# Patient Record
Sex: MALE | Race: WHITE | Employment: FULL TIME | URBAN - METROPOLITAN AREA
[De-identification: names, ages, dates, MRNs, and addresses within clinical notes are randomized per-mention and may not be internally consistent; named-entity substitution may affect disease eponyms.]

---

## 2024-05-01 ENCOUNTER — OFFICE VISIT (OUTPATIENT)
Dept: FAMILY MEDICINE CLINIC | Facility: CLINIC | Age: 44
End: 2024-05-01
Payer: COMMERCIAL

## 2024-05-01 VITALS
SYSTOLIC BLOOD PRESSURE: 128 MMHG | HEIGHT: 72 IN | DIASTOLIC BLOOD PRESSURE: 90 MMHG | RESPIRATION RATE: 20 BRPM | WEIGHT: 234 LBS | HEART RATE: 79 BPM | BODY MASS INDEX: 31.69 KG/M2 | OXYGEN SATURATION: 99 % | TEMPERATURE: 98 F

## 2024-05-01 DIAGNOSIS — J01.00 ACUTE MAXILLARY SINUSITIS, RECURRENCE NOT SPECIFIED: Primary | ICD-10-CM

## 2024-05-01 PROCEDURE — 3074F SYST BP LT 130 MM HG: CPT | Performed by: PHYSICIAN ASSISTANT

## 2024-05-01 PROCEDURE — 3080F DIAST BP >= 90 MM HG: CPT | Performed by: PHYSICIAN ASSISTANT

## 2024-05-01 PROCEDURE — 3008F BODY MASS INDEX DOCD: CPT | Performed by: PHYSICIAN ASSISTANT

## 2024-05-01 PROCEDURE — 99213 OFFICE O/P EST LOW 20 MIN: CPT | Performed by: PHYSICIAN ASSISTANT

## 2024-05-01 RX ORDER — SERTRALINE HYDROCHLORIDE 100 MG/1
100 TABLET, FILM COATED ORAL DAILY
COMMUNITY

## 2024-05-01 RX ORDER — DOXYCYCLINE HYCLATE 100 MG/1
100 CAPSULE ORAL 2 TIMES DAILY
Qty: 20 CAPSULE | Refills: 0 | Status: SHIPPED | OUTPATIENT
Start: 2024-05-01 | End: 2024-05-11

## 2024-05-01 NOTE — PATIENT INSTRUCTIONS
Flonase  Sudafed  Antihistamine  If no improvement start Doxycycline  Probiotic  Follow up with PCP   If worse seek treatment

## 2024-05-01 NOTE — PROGRESS NOTES
CHIEF COMPLAINT:     Chief Complaint   Patient presents with    Cough     S/s for 1 month, worst for 2 weeks.  Wet cough with yellow mucus, right ear clogged and ringing.  OTC sudafed/Flonase taken.         HPI:   Francesco Ross is a 44 year old male who presents for cold symptoms for one month.  Symptoms have progressed into sinus congestion and have been worsening since onset.  The patient reports purulent nasal discharge, nasal congestion, sinus pressure, and post nasal drip. Patient also reports sneezing, cough, low grade temperature of 99, and sweats.   Denies fever, dental pain, tinnitus, N/V/D, SOB and wheezing.  The patient denies history of allergies.  The patient denies itchy/ watery eyes.  Has treated symptoms with OTC Flonase, Sudafed, and Amoxicillin for 10 days.       Current Outpatient Medications   Medication Sig Dispense Refill    sertraline 100 MG Oral Tab Take 1 tablet (100 mg total) by mouth daily.      doxycycline 100 MG Oral Cap Take 1 capsule (100 mg total) by mouth 2 (two) times daily for 10 days. 20 capsule 0    gabapentin 100 MG Oral Cap 1 tablet three times day 7 days then 2 tablets three times day for 3 weeks. 180 capsule 0    guaiFENesin-codeine (CHERATUSSIN AC) 100-10 MG/5ML Oral Solution Take 5 mL by mouth every 6 (six) hours as needed. 236 mL 0      History reviewed. No pertinent past medical history.   Past Surgical History:   Procedure Laterality Date    Larynx surg proc unlisted        History reviewed. No pertinent family history.   Social History     Socioeconomic History    Marital status:    Tobacco Use    Smoking status: Former     Current packs/day: 0.00     Types: Cigarettes     Quit date: 3/17/2012     Years since quittin.1    Smokeless tobacco: Never   Substance and Sexual Activity    Alcohol use: Yes    Drug use: No     Social Determinants of Health      Received from [facility]         REVIEW OF SYSTEMS:   GENERAL:  Normal appetite  SKIN:  no rashes or abnormal skin lesions  HEENT: See HPI.    LUNGS: denies shortness of breath or wheezing, See HPI  CARDIOVASCULAR: denies chest pain or palpitations   GI: denies N/V/C or abdominal pain  NEURO: + sinus headaches.  No numbness or tingling in face.    EXAM:   /90   Pulse 79   Temp 98.2 °F (36.8 °C) (Oral)   Resp 20   Ht 6' (1.829 m)   Wt 234 lb (106.1 kg)   SpO2 99%   BMI 31.74 kg/m²   GENERAL: well developed, well nourished,in no apparent distress  SKIN: no rashes,no suspicious lesions  HEAD: atraumatic, normocephalic, +tenderness on palpation of the right maxillary sinuses  EYES: PERRL. EOMI.  Conjunctiva normal.  Cornea clear.  Lid margins normal.   EARS: Left TM normal, + bulging, no retraction, no fluid, bony landmarks normal.   Right TM normal, no bulging, no retraction, + fluid, bony landmarks normal.  NOSE: nostrils patent, + nasal drainage, nasal mucosa reddened and inflamed.   THROAT: oral mucosa pink, moist. No visible dental caries. Posterior pharynx is without erythema and without exudates.  Uvula is midline.    NECK: supple, non-tender  LUNGS: clear to auscultation bilaterally, no wheezes or rhonchi. Breathing is non labored.  CARDIO: RRR without murmur  EXTREMITIES: no cyanosis, clubbing or edema  LYMPH:  No lymphadenopathy.        ASSESSMENT AND PLAN:   Francesco Ross is a 44 year old male who presents with Cough (S/s for 1 month, worst for 2 weeks.  Wet cough with yellow mucus, right ear clogged and ringing.  OTC sudafed/Flonase taken.  ). Symptoms are consistent with:      ASSESSMENT:  Encounter Diagnosis   Name Primary?    Acute maxillary sinusitis, recurrence not specified Yes         PLAN: Meds as below.  Comfort care instructions as listed in Patient Instructions    Meds & Refills for this Visit:  Requested Prescriptions     Signed Prescriptions Disp Refills    doxycycline 100 MG Oral Cap 20 capsule 0     Sig: Take 1 capsule (100 mg total) by mouth 2 (two) times daily  for 10 days.       Risks, benefits, side effects of medication addressed and explained.    Patient Instructions   Flonase  Sudafed  Antihistamine  If no improvement start Doxycycline  Probiotic  Follow up with PCP   If worse seek treatment      The patient indicates understanding of these issues and agrees to the plan.  The patient is asked to return if sx's persist or worsen.

## 2024-11-02 ENCOUNTER — APPOINTMENT (OUTPATIENT)
Dept: CT IMAGING | Age: 44
End: 2024-11-02
Attending: EMERGENCY MEDICINE
Payer: COMMERCIAL

## 2024-11-02 ENCOUNTER — HOSPITAL ENCOUNTER (INPATIENT)
Facility: HOSPITAL | Age: 44
LOS: 1 days | Discharge: HOME | End: 2024-11-04
Attending: EMERGENCY MEDICINE | Admitting: HOSPITALIST
Payer: COMMERCIAL

## 2024-11-02 DIAGNOSIS — Z85.72 HISTORY OF NON-HODGKIN LYMPHOMA: ICD-10-CM

## 2024-11-02 DIAGNOSIS — K56.609 SBO (SMALL BOWEL OBSTRUCTION) (HCC): Primary | ICD-10-CM

## 2024-11-02 LAB
ALBUMIN SERPL-MCNC: 4.1 G/DL (ref 3.4–5)
ALBUMIN/GLOB SERPL: 1.2 {RATIO} (ref 1–2)
ALP LIVER SERPL-CCNC: 84 U/L
ALT SERPL-CCNC: 32 U/L
ANION GAP SERPL CALC-SCNC: 5 MMOL/L (ref 0–18)
AST SERPL-CCNC: 21 U/L (ref 15–37)
BASOPHILS # BLD AUTO: 0.02 X10(3) UL (ref 0–0.2)
BASOPHILS NFR BLD AUTO: 0.2 %
BILIRUB SERPL-MCNC: 0.4 MG/DL (ref 0.1–2)
BUN BLD-MCNC: 18 MG/DL (ref 9–23)
CALCIUM BLD-MCNC: 9.9 MG/DL (ref 8.5–10.1)
CHLORIDE SERPL-SCNC: 105 MMOL/L (ref 98–112)
CO2 SERPL-SCNC: 27 MMOL/L (ref 21–32)
CREAT BLD-MCNC: 0.92 MG/DL
EGFRCR SERPLBLD CKD-EPI 2021: 105 ML/MIN/1.73M2 (ref 60–?)
EOSINOPHIL # BLD AUTO: 0.16 X10(3) UL (ref 0–0.7)
EOSINOPHIL NFR BLD AUTO: 1.8 %
ERYTHROCYTE [DISTWIDTH] IN BLOOD BY AUTOMATED COUNT: 18.6 %
GLOBULIN PLAS-MCNC: 3.3 G/DL (ref 2.8–4.4)
GLUCOSE BLD-MCNC: 101 MG/DL (ref 70–99)
HCT VFR BLD AUTO: 47.4 %
HGB BLD-MCNC: 14.9 G/DL
IMM GRANULOCYTES # BLD AUTO: 0.02 X10(3) UL (ref 0–1)
IMM GRANULOCYTES NFR BLD: 0.2 %
LIPASE SERPL-CCNC: 33 U/L (ref 12–53)
LYMPHOCYTES # BLD AUTO: 1.95 X10(3) UL (ref 1–4)
LYMPHOCYTES NFR BLD AUTO: 22.3 %
MCH RBC QN AUTO: 21.3 PG (ref 26–34)
MCHC RBC AUTO-ENTMCNC: 31.4 G/DL (ref 31–37)
MCV RBC AUTO: 67.7 FL
MONOCYTES # BLD AUTO: 0.8 X10(3) UL (ref 0.1–1)
MONOCYTES NFR BLD AUTO: 9.2 %
NEUTROPHILS # BLD AUTO: 5.79 X10 (3) UL (ref 1.5–7.7)
NEUTROPHILS # BLD AUTO: 5.79 X10(3) UL (ref 1.5–7.7)
NEUTROPHILS NFR BLD AUTO: 66.3 %
OSMOLALITY SERPL CALC.SUM OF ELEC: 286 MOSM/KG (ref 275–295)
PLATELET # BLD AUTO: 272 10(3)UL (ref 150–450)
POTASSIUM SERPL-SCNC: 3.6 MMOL/L (ref 3.5–5.1)
PROT SERPL-MCNC: 7.4 G/DL (ref 6.4–8.2)
RBC # BLD AUTO: 7 X10(6)UL
SODIUM SERPL-SCNC: 137 MMOL/L (ref 136–145)
WBC # BLD AUTO: 8.7 X10(3) UL (ref 4–11)

## 2024-11-02 PROCEDURE — 74174 CTA ABD&PLVS W/CONTRAST: CPT | Performed by: EMERGENCY MEDICINE

## 2024-11-02 RX ORDER — HYDROMORPHONE HYDROCHLORIDE 1 MG/ML
0.5 INJECTION, SOLUTION INTRAMUSCULAR; INTRAVENOUS; SUBCUTANEOUS EVERY 30 MIN PRN
Status: DISCONTINUED | OUTPATIENT
Start: 2024-11-02 | End: 2024-11-04

## 2024-11-02 RX ORDER — ONDANSETRON 2 MG/ML
4 INJECTION INTRAMUSCULAR; INTRAVENOUS ONCE
Status: COMPLETED | OUTPATIENT
Start: 2024-11-02 | End: 2024-11-02

## 2024-11-03 PROBLEM — Z85.72 HISTORY OF NON-HODGKIN LYMPHOMA: Status: ACTIVE | Noted: 2024-11-03

## 2024-11-03 LAB
ADENOVIRUS F 40/41 PCR: NEGATIVE
ASTROVIRUS PCR: NEGATIVE
C CAYETANENSIS DNA SPEC QL NAA+PROBE: NEGATIVE
C DIFF TOX B STL QL: NEGATIVE
CAMPY SP DNA.DIARRHEA STL QL NAA+PROBE: NEGATIVE
CRYPTOSP DNA SPEC QL NAA+PROBE: NEGATIVE
EAEC PAA PLAS AGGR+AATA ST NAA+NON-PRB: NEGATIVE
EC STX1+STX2 + H7 FLIC SPEC NAA+PROBE: NEGATIVE
ENTAMOEBA HISTOLYTICA PCR: NEGATIVE
EPEC EAE GENE STL QL NAA+NON-PROBE: NEGATIVE
ETEC LTA+ST1A+ST1B TOX ST NAA+NON-PROBE: NEGATIVE
GIARDIA LAMBLIA PCR: NEGATIVE
NOROVIRUS GI/GII PCR: NEGATIVE
P SHIGELLOIDES DNA STL QL NAA+PROBE: NEGATIVE
ROTAVIRUS A PCR: NEGATIVE
SALMONELLA DNA SPEC QL NAA+PROBE: NEGATIVE
SAPOVIRUS PCR: NEGATIVE
SHIGELLA SP+EIEC IPAH ST NAA+NON-PROBE: NEGATIVE
V CHOLERAE DNA SPEC QL NAA+PROBE: NEGATIVE
VIBRIO DNA SPEC NAA+PROBE: NEGATIVE
YERSINIA DNA SPEC NAA+PROBE: NEGATIVE

## 2024-11-03 PROCEDURE — 99255 IP/OBS CONSLTJ NEW/EST HI 80: CPT | Performed by: STUDENT IN AN ORGANIZED HEALTH CARE EDUCATION/TRAINING PROGRAM

## 2024-11-03 PROCEDURE — 99223 1ST HOSP IP/OBS HIGH 75: CPT | Performed by: STUDENT IN AN ORGANIZED HEALTH CARE EDUCATION/TRAINING PROGRAM

## 2024-11-03 RX ORDER — ACETAMINOPHEN 10 MG/ML
1000 INJECTION, SOLUTION INTRAVENOUS EVERY 6 HOURS PRN
Status: DISCONTINUED | OUTPATIENT
Start: 2024-11-03 | End: 2024-11-04

## 2024-11-03 RX ORDER — PROCHLORPERAZINE EDISYLATE 5 MG/ML
5 INJECTION INTRAMUSCULAR; INTRAVENOUS EVERY 8 HOURS PRN
Status: DISCONTINUED | OUTPATIENT
Start: 2024-11-03 | End: 2024-11-04

## 2024-11-03 RX ORDER — ONDANSETRON 2 MG/ML
4 INJECTION INTRAMUSCULAR; INTRAVENOUS EVERY 4 HOURS PRN
Status: ACTIVE | OUTPATIENT
Start: 2024-11-03 | End: 2024-11-03

## 2024-11-03 RX ORDER — HYDROMORPHONE HYDROCHLORIDE 1 MG/ML
0.2 INJECTION, SOLUTION INTRAMUSCULAR; INTRAVENOUS; SUBCUTANEOUS EVERY 2 HOUR PRN
Status: DISCONTINUED | OUTPATIENT
Start: 2024-11-03 | End: 2024-11-04

## 2024-11-03 RX ORDER — SODIUM CHLORIDE 9 MG/ML
INJECTION, SOLUTION INTRAVENOUS CONTINUOUS
Status: DISCONTINUED | OUTPATIENT
Start: 2024-11-03 | End: 2024-11-04

## 2024-11-03 RX ORDER — ONDANSETRON 2 MG/ML
4 INJECTION INTRAMUSCULAR; INTRAVENOUS EVERY 6 HOURS PRN
Status: DISCONTINUED | OUTPATIENT
Start: 2024-11-03 | End: 2024-11-04

## 2024-11-03 RX ORDER — SODIUM CHLORIDE 9 MG/ML
INJECTION, SOLUTION INTRAVENOUS CONTINUOUS
Status: ACTIVE | OUTPATIENT
Start: 2024-11-03 | End: 2024-11-03

## 2024-11-03 RX ORDER — ENOXAPARIN SODIUM 100 MG/ML
40 INJECTION SUBCUTANEOUS DAILY
Status: DISCONTINUED | OUTPATIENT
Start: 2024-11-03 | End: 2024-11-04

## 2024-11-03 RX ORDER — HYDROMORPHONE HYDROCHLORIDE 1 MG/ML
0.4 INJECTION, SOLUTION INTRAMUSCULAR; INTRAVENOUS; SUBCUTANEOUS EVERY 2 HOUR PRN
Status: DISCONTINUED | OUTPATIENT
Start: 2024-11-03 | End: 2024-11-04

## 2024-11-03 RX ORDER — HYDROMORPHONE HYDROCHLORIDE 1 MG/ML
0.5 INJECTION, SOLUTION INTRAMUSCULAR; INTRAVENOUS; SUBCUTANEOUS EVERY 30 MIN PRN
Status: ACTIVE | OUTPATIENT
Start: 2024-11-03 | End: 2024-11-03

## 2024-11-03 RX ORDER — ACETAMINOPHEN 500 MG
1000 TABLET ORAL EVERY 6 HOURS PRN
Status: DISCONTINUED | OUTPATIENT
Start: 2024-11-03 | End: 2024-11-04

## 2024-11-03 RX ORDER — HYDROMORPHONE HYDROCHLORIDE 1 MG/ML
0.8 INJECTION, SOLUTION INTRAMUSCULAR; INTRAVENOUS; SUBCUTANEOUS EVERY 2 HOUR PRN
Status: DISCONTINUED | OUTPATIENT
Start: 2024-11-03 | End: 2024-11-04

## 2024-11-03 RX ORDER — ECHINACEA PURPUREA EXTRACT 125 MG
1 TABLET ORAL
Status: DISCONTINUED | OUTPATIENT
Start: 2024-11-03 | End: 2024-11-04

## 2024-11-03 NOTE — CONSULTS
[facility]                       Gastroenterology Consultation-[facility]    Francesco Ross Patient Status:  Inpatient    2/10/1980 MRN [id number]   Location [facility] 3NW-A Attending Billy Abarca*   Hosp Day # 0 PCP BARON MARCUS MD         Reason for consultation: SBO    HPI:   44-year-old male with a history of Burkitt's lymphoma on chemotherapy presenting with SBO.  Patient began having abdominal pain yesterday with last bowel movement yesterday morning and no flatus at this time.  Patient has a prior SBO related to lymphadenopathy from Burkitt's lymphoma in the past.  Patient is treated at [facility] primarily for this.  Patient with last colonoscopy in  at time of diagnosis of lymphoma.  No issues with chronic diarrhea, chronic abdominal pain, GI bleeding.  CT abdomen pelvis with transition point of SBO in the terminal ileum.    PMHx:   Past Medical History:    Lymphoma (HCC)       PSHx:   Past Surgical History:   Procedure Laterality Date    Hip replacement surgery      Larynx surg proc unlisted         Medications:    [] sodium chloride 0.9% infusion   Intravenous Continuous    [] HYDROmorphone (Dilaudid) 1 MG/ML injection 0.5 mg  0.5 mg Intravenous Q30 Min PRN    [] ondansetron (Zofran) 4 MG/2ML injection 4 mg  4 mg Intravenous Q4H PRN    HYDROmorphone (Dilaudid) 1 MG/ML injection 0.2 mg  0.2 mg Intravenous Q2H PRN    Or    HYDROmorphone (Dilaudid) 1 MG/ML injection 0.4 mg  0.4 mg Intravenous Q2H PRN    Or    HYDROmorphone (Dilaudid) 1 MG/ML injection 0.8 mg  0.8 mg Intravenous Q2H PRN    sodium chloride 0.9% infusion   Intravenous Continuous    enoxaparin (Lovenox) 40 MG/0.4ML SUBQ injection 40 mg  40 mg Subcutaneous Daily    acetaminophen (Ofirmev) 10 mg/mL infusion premix 1,000 mg  1,000 mg Intravenous Q6H PRN    ondansetron (Zofran) 4 MG/2ML injection 4 mg  4 mg Intravenous Q6H PRN    prochlorperazine (Compazine) 10 MG/2ML  injection 5 mg  5 mg Intravenous Q8H PRN    glycerin-hypromellose- (Artificial Tears) 0.2-0.2-1 % ophthalmic solution 1 drop  1 drop Both Eyes QID PRN    sodium chloride (Saline Mist) 0.65 % nasal solution 1 spray  1 spray Each Nare Q3H PRN    HYDROmorphone (Dilaudid) 1 MG/ML injection 0.5 mg  0.5 mg Intravenous Q30 Min PRN    [COMPLETED] ondansetron (Zofran) 4 MG/2ML injection 4 mg  4 mg Intravenous Once    [COMPLETED] sodium chloride 0.9 % IV bolus 1,000 mL  1,000 mL Intravenous Once    [COMPLETED] iopamidol 76% (ISOVUE-370) injection for power injector  80 mL Intravenous ONCE PRN       Allergies: Allergies[1]    SocHx:    Social History     Socioeconomic History    Marital status:    Tobacco Use    Smoking status: Former     Current packs/day: 0.00     Types: Cigarettes     Quit date: 3/17/2012     Years since quittin.6    Smokeless tobacco: Never   Vaping Use    Vaping status: Never Used   Substance and Sexual Activity    Alcohol use: Yes    Drug use: Yes     Types: Cannabis     Social Drivers of Health     Food Insecurity: No Food Insecurity (11/3/2024)    Food Insecurity     Food Insecurity: Never true   Transportation Needs: No Transportation Needs (11/3/2024)    Transportation Needs     Lack of Transportation: No    Received from [facility]    Social Connections   Housing Stability: Low Risk  (11/3/2024)    Housing Stability     Housing Instability: No         FamHx:  No family history on file.      ROS:  In addition to the pertinent positives described above:            Infectious Disease:  No chronic infections or recent fevers prior to the acute illness            Cardiovascular: No history of CAD, prior MI, chest pain, or palpitations            Respiratory: No shortness of breath, asthma, copd, recurrent pneumonia            Hematologic: The patient reports no easy bruising, frequent gum bleeding or nose bleeding;  The patient has no history of known chronic  anemia            Dermatologic: The patient reports no recent rashes or chronic skin disorders            Rheumatologic: The patient reports no history of chronic arthritis, myalgias, arthralgias            Genitourinary:  The patient reports no history of recurrent urinary tract infections, hematuria, dysuria, or nephrolithiasis           Psychiatric: The patient reports no history of depression, anxiety, suicidal ideation, or homicidal ideation           Oncologic: The patient reports on history of prior solid tumor or hematologic malignancy           ENT: The patient reports no hoarseness of voice, hearing loss, sinus congestion, tinnitus           Neurologic: The patient reports no history of seizure, stroke, or frequent headaches      PE: /86 (BP Location: Right arm)   Pulse 83   Temp 98 °F (36.7 °C) (Oral)   Resp 18   Ht 6' 3\" (1.905 m)   Wt 243 lb 8 oz (110.5 kg)   SpO2 100%   BMI 30.44 kg/m²     Gen: AAO x 3, NAD   CV: Regular rate and rhythm  Resp: Clear to auscultation bilaterally  Abdomen: Soft, non-tender, non-distended   Ext: No peripheral edema or cyanosis  Skin: Warm and dry  Psychiatric: Appropriate mood and congruent affect without obvious depression or anxiety    Labs:   Lab Results   Component Value Date    WBC 8.7 11/02/2024    HGB 14.9 11/02/2024    HCT 47.4 11/02/2024    .0 11/02/2024    CREATSERUM 0.92 11/02/2024    BUN 18 11/02/2024     11/02/2024    K 3.6 11/02/2024     11/02/2024    CO2 27.0 11/02/2024     11/02/2024    CA 9.9 11/02/2024    ALB 4.1 11/02/2024    ALKPHO 84 11/02/2024    BILT 0.4 11/02/2024    AST 21 11/02/2024    ALT 32 11/02/2024    LIP 33 11/02/2024     Recent Labs   Lab 11/02/24 2048   *   BUN 18   CREATSERUM 0.92   CA 9.9      K 3.6      CO2 27.0     Recent Labs   Lab 11/02/24 2047   RBC 7.00*   HGB 14.9   HCT 47.4   MCV 67.7*   MCH 21.3*   MCHC 31.4   RDW 18.6   NEPRELIM 5.79   WBC 8.7   .0       Recent  Labs   Lab 11/02/24 2048   ALT 32   AST 21         Assessment and Plan:   -Patient with acute SBO in the setting of Burkitt's lymphoma with no recent colonoscopy  -Would be reasonable to consider colonoscopy, MR enterography, as well as possible PET scan in the near future; case discussed with patient as well as general surgery at length; after long discussion, would be reasonable given establish oncologic care at [facility], to reach out for transfer to [facility] for further care; patient is on board with this plan  -N.p.o.; would consider NG tube for decompression but will defer to general surgery  -General Surgery following  -Supportive care with IV fluids, antiemetics        Thank you for the consultation, we will follow the patient with you.    Royce Ryan MD  9:45 AM  11/3/2024  [facility]  [phone]             [1] No Known Allergies

## 2024-11-03 NOTE — CONSULTS
[facility]  Report of Consultation    Francesco Ross Patient Status:  Inpatient    2/10/1980 MRN [id number]   Location [facility] 3NW-A Attending Billy Abarca*   Hosp Day # 0 PCP BARON MARCUS MD     Requesting Physician:  Dr. Doran    Reason for Consultation:  Small bowel obstruction    Chief Complaint:  Abdominal pain    History of Present Illness:  Francesco Ross is a 44 year old male with a past medical history significant for Burkitt lymphoma s/p chemo and in remission (primary management at [facility]) who presents to [place] emergency department l on 2024 with concerns of abdominal pain that began earlier that day. He reports associated nausea and emesis. He reports last bowel movement earlier that AM. He denies passing flatus. He reports no known sick contact.     Upon presentation to the hospital, he was afebrile and hypertensive (167/126). Laboratory workup revealed no leukocytosis, WBC 8.7, hemoglobin 14.9, platelets 272,000. CMP revealed no gross electrolyte abnormalities, no acute kidney injury, creatinine 0.92, no elevation in LFTs. Lipase within normal limits (33). CT of the abdomen and pelvis revealed multiple mid to distal fluid-filled distended small bowel loops present with nonspecific air-fluid levels consistent with small bowel obstruction. Transition point is at the terminal ileum. There is infiltration of the mesentery and fluid adjacent to the terminal ileum. Small amount of free pelvic fluid.     He has a past medical history significant for Burkitt lymphoma. Per chart review, he was admitted in 2020 to [facility] in [place] for concern of SBO. He had colonoscopy completed at that time where biopsy from ulcer in the terminal ileum reported high-grade lymphoma. He reports he follows with an oncologist at [facility]. He reports no previous abdominal surgeries.  He is not on any blood thinning medication.     History:  Past Medical  History:    Lymphoma (HCC)     Past Surgical History:   Procedure Laterality Date    Hip replacement surgery      Larynx surg proc unlisted       No family history on file.   reports that he quit smoking about 12 years ago. His smoking use included cigarettes. He has never used smokeless tobacco. He reports current alcohol use. He reports current drug use. Drug: Cannabis.    Allergies:  Allergies[1]    Medications:  Medications Prior to Admission   Medication Sig    sertraline 100 MG Oral Tab Take 0.5 tablets (50 mg total) by mouth daily.    gabapentin 100 MG Oral Cap 1 tablet three times day 7 days then 2 tablets three times day for 3 weeks. (Patient not taking: Reported on 11/2/2024)    guaiFENesin-codeine (CHERATUSSIN AC) 100-10 MG/5ML Oral Solution Take 5 mL by mouth every 6 (six) hours as needed. (Patient not taking: Reported on 11/2/2024)         Current Facility-Administered Medications:     HYDROmorphone (Dilaudid) 1 MG/ML injection 0.2 mg, 0.2 mg, Intravenous, Q2H PRN **OR** HYDROmorphone (Dilaudid) 1 MG/ML injection 0.4 mg, 0.4 mg, Intravenous, Q2H PRN **OR** HYDROmorphone (Dilaudid) 1 MG/ML injection 0.8 mg, 0.8 mg, Intravenous, Q2H PRN    sodium chloride 0.9% infusion, , Intravenous, Continuous    enoxaparin (Lovenox) 40 MG/0.4ML SUBQ injection 40 mg, 40 mg, Subcutaneous, Daily    acetaminophen (Ofirmev) 10 mg/mL infusion premix 1,000 mg, 1,000 mg, Intravenous, Q6H PRN    ondansetron (Zofran) 4 MG/2ML injection 4 mg, 4 mg, Intravenous, Q6H PRN    prochlorperazine (Compazine) 10 MG/2ML injection 5 mg, 5 mg, Intravenous, Q8H PRN    glycerin-hypromellose- (Artificial Tears) 0.2-0.2-1 % ophthalmic solution 1 drop, 1 drop, Both Eyes, QID PRN    sodium chloride (Saline Mist) 0.65 % nasal solution 1 spray, 1 spray, Each Nare, Q3H PRN    HYDROmorphone (Dilaudid) 1 MG/ML injection 0.5 mg, 0.5 mg, Intravenous, Q30 Min PRN    Review of Systems:    Allergic/Immuno:  Review of patient's allergies  performed.  Cardiovascular:  Negative for cool extremity and irregular heartbeat/palpitations  Constitutional:  Negative for decreased activity, fever, irritability and lethargy  Endocrine:  Negative for abnormal sleep patterns, increased activity, polydipsia and polyphagia  ENMT:  Negative for ear drainage, hearing loss and nasal drainage  Eyes:  Negative for eye discharge and vision loss  Gastrointestinal:  Positive for abdominal pain, constipation, vomiting. Negative for decreased appetite, diarrhea  Genitourinary:  Negative for dysuria and hematuria  Hema/Lymph:  Negative for easy bleeding and easy bruising  Integumentary:  Negative for pruritus and rash  Musculoskeletal:  Negative for bone/joint symptoms  Neurological:  Negative for gait disturbance  Psychiatric:  Negative for inappropriate interaction and psychiatric symptoms  Respiratory:  Negative for cough, dyspnea and wheezing     Physical Exam:  BP (!) 143/91 (BP Location: Left arm)   Pulse 72   Temp 98.4 °F (36.9 °C) (Oral)   Resp 19   Ht 75\"   Wt 243 lb 8 oz (110.5 kg)   SpO2 99%   BMI 30.44 kg/m²     General: Awake, Alert,  Cooperative.  No apparent distress.  HEENT: Exam is unremarkable.  Without scleral icterus.  Mucous membranes are moist. Oropharynx is clear.  Neck:  Full range of motion to flexion and extension, lateral rotation and lateral flexion of cervical spine.  No JVD. Supple.   Lungs: No respiratory distress, effort normal.   Abdomen:  Soft, non-distended, mildly tender to palpation, with no rebound or guarding.  No peritoneal signs. No ascites.      Extremities:  No lower extremity edema noted.  Without clubbing or cyanosis.    Skin: Normal texture and turgor.  Lymphatic:  No palpable cervical lymphadenopathy.  Neurologic: Cranial nerves are grossly intact.  Motor strength and sensory examination is grossly normal.  No focal neurologic deficit.    Laboratory Data:  Recent Labs   Lab 11/02/24 2047   RBC 7.00*   HGB 14.9   HCT 47.4    MCV 67.7*   MCH 21.3*   MCHC 31.4   RDW 18.6   NEPRELIM 5.79   WBC 8.7   .0       Recent Labs   Lab 11/02/24 2048   *   BUN 18   CREATSERUM 0.92   CA 9.9   ALB 4.1      K 3.6      CO2 27.0   ALKPHO 84   AST 21   ALT 32   BILT 0.4   TP 7.4         No results for input(s): \"PTP\", \"INR\", \"PTT\" in the last 168 hours.      CTA ABD/PEL (CPT=74174)    Result Date: 11/2/2024  CONCLUSION:  1. Findings are most consistent with a small bowel obstruction with transition point at the terminal ileum as detailed above.  Correlate clinically. 2. Patent vessels without evidence for significant stenosis or occlusion.   LOCATION:  [facility]   Dictated by (CST): Kelly Caraballo MD on 11/02/2024 at 9:57 PM     Finalized by (CST): Kelly Caraballo MD on 11/02/2024 at 10:06 PM          Sophy Coello PA-C  11/3/2024  8:16 AM      Medical Decision Making         Impression:  Patient Active Problem List   Diagnosis    Headache, unspecified headache type    SBO (small bowel obstruction) (HCC)    History of non-Hodgkin lymphoma       44 year old male with history of Burkitt lymphoma in remission in 2020 after 8 cycles of R-HYPERCVAD . This was initially diagnosed in the terminal ileum. He returns to the hospital with findings of small bowel obstruction at the terminal ileum. He had a bowel movement today. I started him on clear liquid and will advance as tolerated. If he is tolerating diet and having bowel movements he is clear for discharge and will follow up with his oncologist and his GI physician at [facility] where he had his treatment. If the obstruction has not resolved then we will involve oncology here for further recommendations.       Is this a shared or split note between Advanced Practice Provider and Physician? Yes      Bienvenido Dee MD   [facility] General Surgery                              [1] No Known Allergies

## 2024-11-03 NOTE — ED PROVIDER NOTES
Patient Seen in: [facility] In [place]      History     Chief Complaint   Patient presents with    Abdomen/Flank Pain    Nausea/Vomiting/Diarrhea     Stated Complaint: Pt with acute onset of abdominal pain this morning , + Nausea    Subjective:   HPI      44-year-old male with a history of non-Hodgkin's lymphoma diagnosed and treated 3 years ago presents to the emergency department complaining of umbilical pain that began around 11 AM this morning associated with 4 episodes of vomiting throughout the course of the day.  Pain is constant and nonradiating but has peaks of increased intensity.  Last bowel movement was earlier today and was normal.  Pain does not lateralize.  He has chronic back pain unrelated to this abdominal pain issue.  No urinary symptoms.  No abdominal surgeries.    Objective:     Past Medical History:    Lymphoma (HCC)              Past Surgical History:   Procedure Laterality Date    Hip replacement surgery      Larynx surg proc unlisted                  Social History     Socioeconomic History    Marital status:    Tobacco Use    Smoking status: Former     Current packs/day: 0.00     Types: Cigarettes     Quit date: 3/17/2012     Years since quittin.6    Smokeless tobacco: Never   Vaping Use    Vaping status: Never Used   Substance and Sexual Activity    Alcohol use: Yes    Drug use: Yes     Types: Cannabis     Social Drivers of Health      Received from [facility]    Social Connections    [facility]                  Physical Exam     ED Triage Vitals [24]   BP (!) 167/126   Pulse 95   Resp 20   Temp 98.8 °F (37.1 °C)   Temp src Temporal   SpO2 100 %   O2 Device None (Room air)       Current Vitals:   Vital Signs  BP: (!) 142/93  Pulse: 75  Resp: 18  Temp: 98.8 °F (37.1 °C)  Temp src: Temporal    Oxygen Therapy  SpO2: 97 %  O2 Device: None (Room air)        Physical Exam     General Appearance: This  is a middle-aged male in moderate distress lying on a gurney.  Vital signs were reviewed per nurses notes.  Patient is afebrile.  HEENT: Normocephalic/atraumatic.  Anicteric sclera.  Oral mucosa is moist.  Oropharynx is normal.  Neck: No adenopathy or thyromegaly.  Lungs are clear to auscultation.  Heart exam: Normal S1-S2 without extra sounds or murmurs.  Regular rate and rhythm.  Abdomen: Diminished bowel sounds.  Flat, soft with minimal diffuse tenderness but no masses or rebound.  Skin is dry without rashes or lesions.  Extremities are atraumatic.  Neuroexam: Awake, conversive and moving all 4 extremities well.  ED Course     Labs Reviewed   COMP METABOLIC PANEL (14) - Abnormal; Notable for the following components:       Result Value    Glucose 101 (*)     All other components within normal limits   CBC WITH DIFFERENTIAL WITH PLATELET - Abnormal; Notable for the following components:    RBC 7.00 (*)     MCV 67.7 (*)     MCH 21.3 (*)     All other components within normal limits   LIPASE - Normal   URINALYSIS WITH CULTURE REFLEX   RAINBOW DRAW LAVENDER   RAINBOW DRAW LIGHT GREEN            Intravenous access was obtained.  Laboratory studies were drawn.  IV fluids, analgesics and antiemetics were administered.    CTA ABD/PEL (CPT=74174)    Result Date: 11/2/2024  PROCEDURE:  CTA ABD/PEL (CPT=74174)  COMPARISON:  None.  INDICATIONS:  Pt with acute onset of abdominal pain this morning , + Nausea  TECHNIQUE:  CT images of the abdomen and pelvis were obtained pre- and post- injection of non-ionic intravenous contrast material. Multi-planar reformatted/3-D images were created to optimize visualization of vascular anatomy.  Dose reduction techniques were used. Dose information is transmitted to the ACR (American College of Radiology) NRDR (National Radiology Data Registry) which includes the Dose Index Registry.  PATIENT STATED HISTORY:(As transcribed by Technologist)  Pt with acute onset of abdominal pain this  morning , + Nausea   CONTRAST USED:  80cc of Isovue 370  FINDINGS:  AORTA/VASCULAR:  No aneurysm or dissection.  Patent renal and mesenteric vessels.  LIVER:  No enlargement, atrophy, abnormal density, or significant focal lesion.  BILIARY:  No visible dilatation or calcification.  PANCREAS:  No lesion, fluid collection, ductal dilatation, or atrophy.  SPLEEN:  No enlargement or focal lesion.  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  No mass, obstruction, or calcification.  ADRENALS:  No mass or enlargement.  RETROPERITONEUM:  Normal.  No mass or adenopathy.  BOWEL/MESENTERY:  Multiple mid to distal fluid-filled distended small bowel loops are present with nonspecific air-fluid levels consistent with a small bowel obstruction.  Transition point is at the terminal ileum.  There is infiltration of the mesentery  and fluid adjacent to the terminal ileum.  Small amount of free pelvic fluid.  Decompressed colon. ABDOMINAL WALL:  No mass or hernia.  URINARY BLADDER:  Limited due to artifact.  No visible focal wall thickening, lesion, or calculus.  PELVIC NODES:  No adenopathy.  PELVIC ORGANS:  Limited due to artifact.  Pelvic organs appropriate for patient age.  BONES:  Bilateral hip arthroplasty causes beam hardening artifact limiting evaluation of the surrounding area.  No fracture.  LUNG BASES:  No focal consolidation or pleural effusion.  Elevation of the left hemidiaphragm. OTHER:  Negative.             CONCLUSION:  1. Findings are most consistent with a small bowel obstruction with transition point at the terminal ileum as detailed above.  Correlate clinically. 2. Patent vessels without evidence for significant stenosis or occlusion.   LOCATION:  [facility]   Dictated by (CST): Kelly Caraballo MD on 11/02/2024 at 9:57 PM     Finalized by (CST): Kelly Caraballo MD on 11/02/2024 at 10:06 PM       I personally reviewed the images myself and went over results with patient.    I viewed the CT abdomen and pelvis films  myself.  There is evidence of a small bowel obstruction with a terminal ileum transition point.    Test results and treatment plan were discussed.  David hospitalist on-call as well as general surgery on-call were both contacted via [employer] for hospitalization.       MDM      #1.  Abdominal pain and vomiting secondary to small bowel obstruction.  No evidence of mesenteric ischemia or other intra-abdominal or retroperitoneal pathology such as kidney stone, diverticulitis or appendicitis.  Treated with IV fluids, bowel rest, analgesics and antiemetics.  2.  History of non-Hodgkin's lymphoma.    Admission disposition: 11/2/2024 10:54 PM           Medical Decision Making      Disposition and Plan     Clinical Impression:  1. SBO (small bowel obstruction) ([facility])    2. History of non-Hodgkin lymphoma         Disposition:  Admit  11/2/2024 10:54 pm    Follow-up:  No follow-up provider specified.        Medications Prescribed:  Current Discharge Medication List              Supplementary Documentation:         Hospital Problems       Present on Admission  Date Reviewed: 5/1/2024            ICD-10-CM Noted POA    * (Principal) SBO (small bowel obstruction) (HCC) K56.609 11/2/2024 Unknown

## 2024-11-03 NOTE — H&P
[facility]     Francesco Ross Patient Status:  Inpatient    2/10/1980 MRN [id number]   Location 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 His pain began yesterday and was a cramping and sometimes sharp pain in his LLQ rated 3-9/10 with no alleviating or exacerbating factors. He had nausea without emesis and denied any fever or chills. His last bowel movement was yesterday morning but he has not passed flatus since then. He reports a history of prior SBO related to lymphadenopathy from his Burkitt lymphoma.    History/Other:    Past Medical History:  Past Medical History:    Lymphoma (HCC)     Past Surgical History:   Past Surgical History:   Procedure Laterality Date    Hip replacement surgery      Larynx surg proc unlisted        Family History:   No family history on file.  Social History:    reports that he quit smoking about 12 years ago. His smoking use included cigarettes. He has never used smokeless tobacco. He reports current alcohol use. He reports current drug use. Drug: Cannabis.     Allergies: Allergies[1]    Medications:  Medications Ordered Prior to Encounter[2]    Review of Systems:   A comprehensive review of systems was completed.    Pertinent positives and negatives noted in the HPI.    Objective:   Physical Exam:    BP (!) 133/105 (BP Location: Left arm)   Pulse 85   Temp 97.9 °F (36.6 °C) (Oral)   Resp 18   Ht 6' 3\" (1.905 m)   Wt 243 lb 8 oz (110.5 kg)   SpO2 97%   BMI 30.44 kg/m²   General: No acute distress, Alert  Respiratory: No rhonchi, no wheezes  Cardiovascular: S1, S2. Regular rate and rhythm  Abdomen: Soft, Non-tender, non-distended, decreased bowel sounds  Neuro: No new  focal deficits  Extremities: No edema    Results:    Labs:      Labs Last 24 Hours:    Recent Labs   Lab 11/02/24 2047   RBC 7.00*   HGB 14.9   HCT 47.4   MCV 67.7*   MCH 21.3*   MCHC 31.4   RDW 18.6   NEPRELIM 5.79   WBC 8.7   .0       Recent Labs   Lab 11/02/24 2048   *   BUN 18   CREATSERUM 0.92   EGFRCR 105   CA 9.9   ALB 4.1      K 3.6      CO2 27.0   ALKPHO 84   AST 21   ALT 32   BILT 0.4   TP 7.4       No results found for: \"PT\", \"INR\"    No results for input(s): \"TROP\", \"TROPHS\", \"CK\" in the last 168 hours.    No results for input(s): \"TROP\", \"PBNP\" in the last 168 hours.    No results for input(s): \"PCT\" in the last 168 hours.    Imaging: Imaging data reviewed in Epic.    Assessment & Plan:      #SBO  -LFT WNL  -CT showing SBO with transition point in terminal ileum  -NPO  -zofran prn  -pain control: IV tylenol, dilaudid prn  -general surgery c/s in ED    #depression  -hold home sertraline while NPO        Plan of care discussed with ED physician    Aria Wall DO    Supplementary Documentation:     The 21st Century Cures Act makes medical notes like these available to patients in the interest of transparency. Please be advised this is a medical document. Medical documents are intended to carry relevant information, facts as evident, and the clinical opinion of the practitioner. The medical note is intended as peer to peer communication and may appear blunt or direct. It is written in medical language and may contain abbreviations or verbiage that are unfamiliar.                                       [1] No Known Allergies  [2]   No current facility-administered medications on file prior to encounter.     Current Outpatient Medications on File Prior to Encounter   Medication Sig Dispense Refill    sertraline 100 MG Oral Tab Take 0.5 tablets (50 mg total) by mouth daily.      gabapentin 100 MG Oral Cap 1 tablet three times day 7 days then 2 tablets three times day for 3 weeks.  (Patient not taking: Reported on 11/2/2024) 180 capsule 0    guaiFENesin-codeine (CHERATUSSIN AC) 100-10 MG/5ML Oral Solution Take 5 mL by mouth every 6 (six) hours as needed. (Patient not taking: Reported on 11/2/2024) 236 mL 0

## 2024-11-03 NOTE — PROGRESS NOTES
Alert & oriented x4. VSS on room air. Voids. Advanced to clear liquid diet since he had a BM. Abdomen soft, non-distended. Ambulates independently. Denies nausea/chest pain/SOB. Pain controlled with PRN tylenol and dilaudid. Patient and wife updated on plan of care. Questions and concerns addressed. Frequent rounding performed    1313: Tolerating clear liquid diet, advanced to fulls    1830: Tolerating soft diet, encouraged to walk in halls. Tylenol only for pain control

## 2024-11-03 NOTE — ED INITIAL ASSESSMENT (HPI)
Pt with c/o sudden onset of mid abdominal pain that started this morning. Pt with one episode of emesis this morning.

## 2024-11-03 NOTE — PROGRESS NOTES
NURSING ADMISSION NOTE      Patient admitted via Wheelchair  Oriented to room.  Safety precautions initiated.  Bed in low position.  Call light in reach.        A&Ox4. RA. VSS. Denies nausea/SOB/ chest pain.   Due to void. Up ad redd.   2 person skin check done with lindsay ADAMS, skin intact.   Pain controlled per MAR.  All appropriate safety measures in place. All questions and concerns were addressed

## 2024-11-04 VITALS
OXYGEN SATURATION: 97 % | WEIGHT: 243.5 LBS | SYSTOLIC BLOOD PRESSURE: 126 MMHG | TEMPERATURE: 99 F | DIASTOLIC BLOOD PRESSURE: 82 MMHG | HEART RATE: 83 BPM | HEIGHT: 75 IN | BODY MASS INDEX: 30.28 KG/M2 | RESPIRATION RATE: 16 BRPM

## 2024-11-04 PROCEDURE — 99239 HOSP IP/OBS DSCHRG MGMT >30: CPT | Performed by: INTERNAL MEDICINE

## 2024-11-04 NOTE — PROGRESS NOTES
A&Ox4. VSS. RA. .  Telemetry: NSR  GI: Abdomen soft, nondistended. Passing gas.  Denies nausea.  : Voids.  Pain controlled with PRN pain medications  Up ad redd  Diet:regular diet   All appropriate safety measures in place. All questions and concerns addressed.

## 2024-11-04 NOTE — DISCHARGE SUMMARY
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 His pain began yesterday and was a cramping and sometimes sharp pain in his LLQ rated 3-9/10 with no alleviating or exacerbating factors. He had nausea without emesis and denied any fever or chills. His last bowel movement was yesterday morning but he has not passed flatus since then. He reports a history of prior SBO related to lymphadenopathy from his Burkitt lymphoma.          Brief Synopsis: patient admitted and placed on IVF< bowel rest and conservatively management. He has responded well and tolerated diet. He is cleared for discharge and will follow up as outpatient.    Lace+ Score: 24  59-90 High Risk  29-58 Medium Risk  0-28   Low Risk       TCM Follow-Up Recommendation:  LACE < 29: Low Risk of readmission after discharge from the hospital; Still recommend for TCM follow-up.    Consultants:  Surgery, GI    Discharge Medication List:     Discharge Medications        CONTINUE taking these medications        Instructions Prescription details   sertraline 100 MG Tabs  Commonly known as: Zoloft      Take 0.5 tablets (50 mg total) by mouth daily.   Refills: 0            STOP taking these medications      gabapentin 100 MG Caps  Commonly known as: Neurontin        guaiFENesin-codeine 100-10 MG/5ML Soln  Commonly known as: Cheratussin AC                 [place]: [place]    Follow-up appointment:   Royce Ryan MD  [address] [place] [address]  [phone]    Follow up  As needed    Appointments for Next 30 Days 2024 - 2024      None            Vital signs:  Temp:  [97.9 °F (36.6 °C)-98.6 °F (37 °C)] 98.6 °F (37 °C)  Pulse:  [72-83] 83  Resp:  [14-17] 16  BP: (115-133)/(77-93) 126/82  SpO2:  [97 %-100 %] 97 %    Physical Exam:    General: No acute distress   Lungs: clear to auscultation  Cardiovascular: S1, S2  Abdomen: Soft NTND    -----------------------------------------------------------------------------------------------  PATIENT DISCHARGE INSTRUCTIONS: See electronic chart    Pamella Stacy MD    Total time spent on discharge plannin minutes     The  Cent[place] Cures Act makes medical notes like these available to patients in the interest of transparency. Please be advised this is a medical document. Medical documents are intended to carry relevant information, facts as evident, and the clinical opinion of the practitioner. The medical note is intended as peer to peer communication and may appear blunt or direct. It is written in medical language and may contain abbreviations or verbiage that are unfamiliar.

## 2024-11-04 NOTE — PROGRESS NOTES
NURSING DISCHARGE NOTE    Discharged Home via  walking .  Accompanied by Spouse  Belongings Taken by patient/family.    Alert & oriented x4. VSS on room air. Voids. Tolerating soft diet. Abdomen soft, non-distended. Ambulates independently. Denies nausea/chest pain/SOB. Denies pain. IV and telemetry removed. Patient declined wheelchair and left unit at 1028

## 2024-11-04 NOTE — PROGRESS NOTES
Inpatient Follow up Note    Francesco Ross Patient Status:  Inpatient    2/10/1980 MRN [id number]   Location [facility] 3NW-A Attending No att. providers found   Hosp Day # 1 PCP BARON MARCUS MD     Reason for Consultation   SBO     Subjective   Feels normal/well, no abdominal pain/vomiting, having Bms.             Objective:     /82 (BP Location: Right arm)   Pulse 83   Temp 98.6 °F (37 °C) (Oral)   Resp 16   Ht 6' 3\" (1.905 m)   Wt 243 lb 8 oz (110.5 kg)   SpO2 97%   BMI 30.44 kg/m²   Gen: AAOx3  CV: RRR with normal S1 / S2  Resp: CTA bilaterally  Abd: (+)BS, soft, non-tender, non-distended; no rebound or guarding  Ext: No edema or cyanosis  Skin: Warm and dry     Labs/Imaging     LABRCNTIP[PGLU:5@  No results for input(s): \"INR\" in the last 168 hours.      Recent Labs   Lab 24   WBC 8.7   HGB 14.9   .0       Recent Labs   Lab 24      K 3.6      CO2 27.0   BUN 18   CREATSERUM 0.92       Recent Labs   Lab 24   ALT 32   AST 21     CTA ABD/PEL (CPT=74174)    Result Date: 2024  CONCLUSION:  1. Findings are most consistent with a small bowel obstruction with transition point at the terminal ileum as detailed above.  Correlate clinically. 2. Patent vessels without evidence for significant stenosis or occlusion.   LOCATION:  [facility]   Dictated by (CST): Kelly Caraballo MD on 2024 at 9:57 PM     Finalized by (CST): Kelly Caraballo MD on 2024 at 10:06 PM      AST   Date/Time Value Ref Range Status   2024 08:48 PM 21 15 - 37 U/L Final     ALT   Date/Time Value Ref Range Status   2024 08:48 PM 32 16 - 61 U/L Final     BUN   Date/Time Value Ref Range Status   2024 08:48 PM 18 9 - 23 mg/dL Final              Assessment   Mr Ross is a 44 year old male with a history of Burkitt's lymphoma who presented with a SBO with transition point in the TI. He presented similarly in , with a TI ulcer positive for lymphoma on  biopsy. His SBO has now resolved and he feels well. Ddx includes secondary to recurrent lymphoma, adhesions at TI, less likely IBD.         Plan   -discussed colonoscopy (which I recommended) with patient and outpatient GI f/u, discussed risks/benefits, he prefers currently to follow up with [place], where his oncologist is, to pursue outpatient GI f/u and colonoscopy vs inpatient. He also will consider f/u with his prior GI physician in [place]. I also offered follow up with our practice, he will consider and contact us if he decides to follow up with us  -ok to DC from GI perspective  -low fiber/ruffage diet  -patient contacted his oncologist, which I also recommended         Merlin Aguirre MD  12:10 PM  11/4/2024  [facility]  [phone]

## 2024-11-05 NOTE — PAYOR COMM NOTE
--------------  ADMISSION REVIEW     Payor: SANDI HOLGUIN  Subscriber #:  [id number]  Authorization Number: [id number]    Admit date: 11/3/24  Admit time:  1:14 AM       REVIEW DOCUMENTATION:     ED Provider Notes        ED Provider Notes signed by Mckayla Doran MD at 11/2/2024 11:21 PM    Patient Seen in: [facility] In [place]      History     Chief Complaint   Patient presents with    Abdomen/Flank Pain    Nausea/Vomiting/Diarrhea     Stated Complaint: Pt with acute onset of abdominal pain this morning , + Nausea    Subjective:   HPI      44-year-old male with a history of non-Hodgkin's lymphoma diagnosed and treated 3 years ago presents to the emergency department complaining of umbilical pain that began around 11 AM this morning associated with 4 episodes of vomiting throughout the course of the day.  Pain is constant and nonradiating but has peaks of increased intensity.  Last bowel movement was earlier today and was normal.  Pain does not lateralize.  He has chronic back pain unrelated to this abdominal pain issue.  No urinary symptoms.  No abdominal surgeries.    Objective:     Past Medical History:    Lymphoma (HCC)         Physical Exam     ED Triage Vitals [11/02/24 2035]   BP (!) 167/126   Pulse 95   Resp 20   Temp 98.8 °F (37.1 °C)   Temp src Temporal   SpO2 100 %   O2 Device None (Room air)       Current Vitals:   Vital Signs  BP: (!) 142/93  Pulse: 75  Resp: 18  Temp: 98.8 °F (37.1 °C)  Temp src: Temporal    Oxygen Therapy  SpO2: 97 %  O2 Device: None (Room air)        Physical Exam     General Appearance: This is a middle-aged male in moderate distress lying on a gurney.  Vital signs were reviewed per nurses notes.  Patient is afebrile.  HEENT: Normocephalic/atraumatic.  Anicteric sclera.  Oral mucosa is moist.  Oropharynx is normal.  Neck: No adenopathy or thyromegaly.  Lungs are clear to auscultation.  Heart exam: Normal S1-S2 without extra sounds or murmurs.  Regular rate and  rhythm.  Abdomen: Diminished bowel sounds.  Flat, soft with minimal diffuse tenderness but no masses or rebound.  Skin is dry without rashes or lesions.  Extremities are atraumatic.  Neuroexam: Awake, conversive and moving all 4 extremities well.  ED Course     Labs Reviewed   COMP METABOLIC PANEL (14) - Abnormal; Notable for the following components:       Result Value    Glucose 101 (*)     All other components within normal limits   CBC WITH DIFFERENTIAL WITH PLATELET - Abnormal; Notable for the following components:    RBC 7.00 (*)     MCV 67.7 (*)     MCH 21.3 (*)     All other components within normal limits   LIPASE - Normal   URINALYSIS WITH CULTURE REFLEX   RAINBOW DRAW LAVENDER   RAINBOW DRAW LIGHT GREEN       Intravenous access was obtained.  Laboratory studies were drawn.  IV fluids, analgesics and antiemetics were administered.    CTA ABD/PEL (CPT=74174)    Result Date: 11/2/2024  PROCEDURE:  CTA ABD/PEL (CPT=74174)  COMPARISON:  None.  INDICATIONS:  Pt with acute onset of abdominal pain this morning , + Nausea  TECHNIQUE:  CT images of the abdomen and pelvis were obtained pre- and post- injection of non-ionic intravenous contrast material. Multi-planar reformatted/3-D images were created to optimize visualization of vascular anatomy.  Dose reduction techniques were used. Dose information is transmitted to the ACR (American College of Radiology) NRDR (National Radiology Data Registry) which includes the Dose Index Registry.  PATIENT STATED HISTORY:(As transcribed by Technologist)  Pt with acute onset of abdominal pain this morning , + Nausea   CONTRAST USED:  80cc of Isovue 370  FINDINGS:  AORTA/VASCULAR:  No aneurysm or dissection.  Patent renal and mesenteric vessels.  LIVER:  No enlargement, atrophy, abnormal density, or significant focal lesion.  BILIARY:  No visible dilatation or calcification.  PANCREAS:  No lesion, fluid collection, ductal dilatation, or atrophy.  SPLEEN:  No enlargement or focal  lesion.  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  No mass, obstruction, or calcification.  ADRENALS:  No mass or enlargement.  RETROPERITONEUM:  Normal.  No mass or adenopathy.  BOWEL/MESENTERY:  Multiple mid to distal fluid-filled distended small bowel loops are present with nonspecific air-fluid levels consistent with a small bowel obstruction.  Transition point is at the terminal ileum.  There is infiltration of the mesentery  and fluid adjacent to the terminal ileum.  Small amount of free pelvic fluid.  Decompressed colon. ABDOMINAL WALL:  No mass or hernia.  URINARY BLADDER:  Limited due to artifact.  No visible focal wall thickening, lesion, or calculus.  PELVIC NODES:  No adenopathy.  PELVIC ORGANS:  Limited due to artifact.  Pelvic organs appropriate for patient age.  BONES:  Bilateral hip arthroplasty causes beam hardening artifact limiting evaluation of the surrounding area.  No fracture.  LUNG BASES:  No focal consolidation or pleural effusion.  Elevation of the left hemidiaphragm. OTHER:  Negative.             CONCLUSION:  1. Findings are most consistent with a small bowel obstruction with transition point at the terminal ileum as detailed above.  Correlate clinically. 2. Patent vessels without evidence for significant stenosis or occlusion.   LOCATION:  [facility]   Dictated by (CST): Kelly Caraballo MD on 11/02/2024 at 9:57 PM     Finalized by (CST): Kelly Caraballo MD on 11/02/2024 at 10:06 PM       I personally reviewed the images myself and went over results with patient.    I viewed the CT abdomen and pelvis films myself.  There is evidence of a small bowel obstruction with a terminal ileum transition point.    Test results and treatment plan were discussed.  [facility] hospitalist on-call as well as general surgery on-call were both contacted via [employer] for hospitalization.      MDM      #1.  Abdominal pain and vomiting secondary to small bowel obstruction.  No evidence of mesenteric ischemia or  other intra-abdominal or retroperitoneal pathology such as kidney stone, diverticulitis or appendicitis.  Treated with IV fluids, bowel rest, analgesics and antiemetics.  2.  History of non-Hodgkin's lymphoma.    Admission disposition: 11/2/2024 10:54 PM           Medical Decision Making      Disposition and Plan     Clinical Impression:  1. SBO (small bowel obstruction) (HCC)    2. 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 His pain began yesterday and was a cramping and sometimes sharp pain in his LLQ rated 3-9/10 with no alleviating or exacerbating factors. He had nausea without emesis and denied any fever or chills. His last bowel movement was yesterday morning but he has not passed flatus since then. He reports a history of prior SBO related to lymphadenopathy from his Burkitt lymphoma.     Assessment & Plan:       #SBO  -LFT WNL  -CT showing SBO with transition point in terminal ileum  -NPO  -zofran prn  -pain control: IV tylenol, dilaudid prn  -general surgery c/s in ED     #depression  -hold home sertraline while NPO           Plan of care discussed with ED physician     Aria Wall DO          11/4 DISCHARGED          Date of Service: 11/4/2024 12:09 PM     Signed                   Inpatient Follow up Note        Reason for Consultation   SBO      Subjective   Feels normal/well, no abdominal pain/vomiting,  having Bms.                  Objective:      /82 (BP Location: Right arm)   Pulse 83   Temp 98.6 °F (37 °C) (Oral)   Resp 16   Ht 6' 3\" (1.905 m)   Wt 243 lb 8 oz (110.5 kg)   SpO2 97%   BMI 30.44 kg/m²   Gen: AAOx3  CV: RRR with normal S1 / S2  Resp: CTA bilaterally  Abd: (+)BS, soft, non-tender, non-distended; no rebound or guarding  Ext: No edema or cyanosis  Skin: Warm and dry      Labs/Imaging      LABRCNTIP[PGLU:5@  No results for input(s): \"INR\" in the last 168 hours.            Recent Labs   Lab 11/02/24 2047   WBC 8.7   HGB 14.9   .0             Recent Labs   Lab 11/02/24 2048      K 3.6      CO2 27.0   BUN 18   CREATSERUM 0.92             Recent Labs   Lab 11/02/24 2048   ALT 32   AST 21      CTA ABD/PEL (CPT=74174)     Result Date: 11/2/2024  CONCLUSION:  1. Findings are most consistent with a small bowel obstruction with transition point at the terminal ileum as detailed above.  Correlate clinically. 2. Patent vessels without evidence for significant stenosis or occlusion.   LOCATION:  [facility]   Dictated by (CST): Kelly Caraballo MD on 11/02/2024 at 9:57 PM     Finalized by (CST): Kelly Caraballo MD on 11/02/2024 at 10:06 PM            AST   Date/Time Value Ref Range Status   11/02/2024 08:48 PM 21 15 - 37 U/L Final            ALT   Date/Time Value Ref Range Status   11/02/2024 08:48 PM 32 16 - 61 U/L Final            BUN   Date/Time Value Ref Range Status   11/02/2024 08:48 PM 18 9 - 23 mg/dL Final                   Assessment   Mr Ross is a 44 year old male with a history of Burkitt's lymphoma who presented with a SBO with transition point in the TI. He presented similarly in 2020, with a TI ulcer positive for lymphoma on biopsy. His SBO has now resolved and he feels well. Ddx includes secondary to recurrent lymphoma, adhesions at TI, less likely IBD.            Plan   -discussed colonoscopy (which I recommended) with patient and outpatient GI f/u, discussed risks/benefits,  he prefers currently to follow up with [facility], where his oncologist is, to pursue outpatient GI f/u and colonoscopy vs inpatient. He also will consider f/u with his prior GI physician in [place]. I also offered follow up with our practice, he will consider and contact us if he decides to follow up with us  -ok to DC from GI perspective  -low fiber/ruffage diet  -patient contacted his oncologist, which I also recommended            Merlin Aguirre MD  12:10 PM  11/4/2024  [facility]                             Vitals (last day) before discharge       Date/Time Temp Pulse Resp BP SpO2 Weight O2 Device O2 Flow Rate (L/min) [facility]    11/04/24 0834 98.6 °F (37 °C) 83 16 126/82 97 % -- None (Room air) --     11/04/24 0500 97.9 °F (36.6 °C) 79 14 121/86 100 % -- None (Room air) --     11/03/24 2353 98.1 °F (36.7 °C) 79 17 115/77 97 % -- None (Room air) --     11/03/24 1927 98.6 °F (37 °C) 75 15 133/85 97 % -- None (Room air) --     11/03/24 1726 98.1 °F (36.7 °C) 72 16 123/87 100 % -- None (Room air) -- SC    11/03/24 1222 98 °F (36.7 °C) 79 16 123/93 100 % -- None (Room air) -- SC    11/03/24 0856 98 °F (36.7 °C) 83 18 137/86 100 % -- None (Room air) -- SC    11/03/24 0545 98.4 °F (36.9 °C) 72 19 143/91 99 % -- None (Room air) -- GB    11/03/24 0135 -- -- -- -- -- 243 lb 8 oz (110.5 kg) -- -- AM    11/03/24 0135 97.9 °F (36.6 °C) 85 18 133/105 -- -- None (Room air) -- LQ           Medication Administration Report  for Francesco Ross as of 10/26/24 through 11/04/24  Legend:       Medications 10/26 10/27 10/28 10/29 10/30 10/31 11/01 11/02 11/03 11/04   Completed Medications   iopamidol 76% (ISOVUE-370) injection for power injector  Dose: 80 mL  Freq: IMG once as needed Route: IV  PRN Reason: contrast  Start: 11/02/24 2115 End: 11/02/24 2140 21401          ondansetron (Zofran) 4 MG/2ML injection 4 mg  Dose: 4 mg  Freq: Once Route: IV  Start: 11/02/24 2046 End: 11/02/24 2047 20472           sodium chloride 0.9 % IV bolus 1,000 mL  Dose: 1,000 mL  Freq: Once Route: IV  Start: 11/02/24 2046 End: 11/02/24 2146           [id number]     [id number]          Discontinued Medications   Medications 10/26 10/27 10/28 10/29 10/30 10/31 11/01 11/02 11/03 11/04   acetaminophen (Ofirmev) 10 mg/mL infusion premix 1,000 mg  Dose: 1,000 mg  Freq: Every 6 hours PRN Route: IV  PRN Reason: Pain  Start: 11/03/24 0150 End: 11/04/24 1303   Order specific questions:               [id number]     05345         acetaminophen (Tylenol Extra Strength) tab 1,000 mg  Dose: 1,000 mg  Freq: Every 6 hours PRN Route: OR  PRN Reasons: moderate pain,Fever  Start: 11/03/24 1815 End: 11/04/24 1303   Admin Instructions:   Use PRN reason as a guide and follow range order policy            [id number]         enoxaparin (Lovenox) 40 MG/0.4ML SUBQ injection 40 mg  Dose: 40 mg  Freq: Daily Route: SC  Start: 11/03/24 0930 End: 11/04/24 1303   Admin Instructions:   Only administer Enoxaparin subcutaneously into the abdomen unless directed otherwise by a physician. Alternate injection sites between the left and right anterolateral and left and right posterolateral abdominal wall.            72052      94470                                  Or   HYDROmorphone (Dilaudid) 1 MG/ML injection 0.4 mg  Dose: 0.4 mg  Freq: Every 2 hour PRN Route: IV  PRN Reason: moderate pain  Start: 11/03/24 0155 End: 11/04/24 1303   Admin Instructions:   Use PRN reason as a guide and follow range order policy. If oral pain meds are ordered and patient can tolerate oral intake, start with PRN oral pain medications first.            13     [id number]     15         Or   HYDROmorphone (Dilaudid) 1 MG/ML injection 0.8 mg  Dose: 0.8 mg  Freq: Every 2 hour PRN Route: IV  PRN Reason: severe pain  Start: 11/03/24 0155 End: 11/04/24 1303   Admin Instructions:   Use PRN reason as a guide and follow range order policy. If oral pain meds are ordered and patient can tolerate oral intake, start with PRN  oral pain medications first.            [id number]     17     [id number]                      HYDROmorphone (Dilaudid) 1 MG/ML injection 0.5 mg  Dose: 0.5 mg  Freq: Every 30 min PRN Route: IV  PRN Reason: moderate pain  Start: 11/02/24 2044 End: 11/04/24 1303           [id number]     [id number]      [id number]         ondansetron (Zofran) 4 MG/2ML injection 4 mg  Dose: 4 mg  Freq: Every 6 hours PRN Route: IV  PRN Reasons: Nausea,vomiting  Start: 11/03/24 0150 End: 11/04/24 1303   Admin Instructions:   Default antiemetic sequence (unless otherwise preferred by patient):  1. ondansetron (Zofran)  2. prochlorperazine (Compazine). Wait 15 minutes before proceeding to next medication in sequence.  Follow therapeutic duplication policy.            [id number]     (0641) [C]23                                                sodium chloride 0.9% infusion  Rate: 100 mL/hr  Freq: Continuous Route: IV  Start: 11/03/24 0200 End: 11/04/24 1303            [id number]     [id number]                      Medications 10/26 10/27 10/28 10/29 10/30 10/31 11/01 11/02 11/03 11/04